# Patient Record
Sex: MALE | Race: BLACK OR AFRICAN AMERICAN | URBAN - METROPOLITAN AREA
[De-identification: names, ages, dates, MRNs, and addresses within clinical notes are randomized per-mention and may not be internally consistent; named-entity substitution may affect disease eponyms.]

---

## 2017-03-07 ENCOUNTER — OFFICE VISIT (OUTPATIENT)
Dept: INTERNAL MEDICINE CLINIC | Age: 32
End: 2017-03-07

## 2017-03-07 ENCOUNTER — TELEPHONE (OUTPATIENT)
Dept: INTERNAL MEDICINE CLINIC | Age: 32
End: 2017-03-07

## 2017-03-07 VITALS
RESPIRATION RATE: 16 BRPM | HEART RATE: 69 BPM | TEMPERATURE: 98.4 F | SYSTOLIC BLOOD PRESSURE: 136 MMHG | WEIGHT: 250 LBS | HEIGHT: 68 IN | BODY MASS INDEX: 37.89 KG/M2 | OXYGEN SATURATION: 100 % | DIASTOLIC BLOOD PRESSURE: 85 MMHG

## 2017-03-07 DIAGNOSIS — Z23 ENCOUNTER FOR IMMUNIZATION: ICD-10-CM

## 2017-03-07 DIAGNOSIS — Z23 NEED FOR HEPATITIS A IMMUNIZATION: ICD-10-CM

## 2017-03-07 DIAGNOSIS — Z23 NEED FOR TDAP VACCINATION: Primary | ICD-10-CM

## 2017-03-07 NOTE — PATIENT INSTRUCTIONS
Hepatitis A Vaccine: What You Need to Know  Why get vaccinated? Hepatitis A is a serious liver disease. It is caused by the hepatitis A virus (HAV). HAV is spread from person to person through contact with the feces (stool) of people who are infected, which can easily happen if someone does not wash his or her hands properly. You can also get hepatitis A from food, water, or objects contaminated with HAV. Symptoms of hepatitis A can include:  · Fever, fatigue, loss of appetite, nausea, vomiting, and/or joint pain. · Severe stomach pains and diarrhea (mainly in children). · Jaundice (yellow skin or eyes, dark urine, phillip-colored bowel movements). These symptoms usually appear 2 to 6 weeks after exposure and usually last less than 2 months, although some people can be ill for as long as 6 months. If you have hepatitis A, you may be too ill to work. Children often do not have symptoms, but most adults do. You can spread HAV without having symptoms. Hepatitis A can cause liver failure and death, although this is rare and occurs more commonly in persons 48years of age or older and persons with other liver diseases, such as hepatitis B or C. Hepatitis A vaccine can prevent hepatitis A. Hepatitis A vaccines were recommended in the [facility] beginning in 1996. Since then, the number of cases reported each year in the [place] has dropped from around 31,000 cases to fewer than 1,500 cases. Hepatitis A vaccine  Hepatitis A vaccine is an inactivated (killed) vaccine. You will need 2 doses for long-lasting protection. These doses should be given at least 6 months apart. Children are routinely vaccinated between their first and second birthdays (15 through 22 months of age). Older children and adolescents can get the vaccine after 23 months. Adults who have not been vaccinated previously and want to be protected against hepatitis A can also get the vaccine.   You should get hepatitis A vaccine if you:  · Are traveling to countries where hepatitis A is common. · Are a man who has sex with other men. · Use illegal drugs. · Have a chronic liver disease such as hepatitis B or hepatitis C.  · Are being treated with clotting-factor concentrates. · Work with hepatitis A-infected animals or in a hepatitis A research laboratory. · Expect to have close personal contact with an international adoptee from a country where hepatitis A is common. Ask your healthcare provider if you want more information about any of these groups. There are no known risks to getting hepatitis A vaccine at the same time as other vaccines. Some people should not get this vaccine  Tell the person who is giving you the vaccine:  · If you have any severe, life-threatening allergies. If you ever had a life-threatening allergic reaction after a dose of hepatitis A vaccine, or have a severe allergy to any part of this vaccine, you may be advised not to get vaccinated. Ask your health care provider if you want information about vaccine components. · If you are not feeling well. If you have a mild illness, such as a cold, you can probably get the vaccine today. If you are moderately or severely ill, you should probably wait until you recover. Your doctor can advise you. Risks of a vaccine reaction  With any medicine, including vaccines, there is a chance of side effects. These are usually mild and go away on their own, but serious reactions are also possible. Most people who get hepatitis A vaccine do not have any problems with it. Minor problems following hepatitis A vaccine include:  · Soreness or redness where the shot was given  · Low-grade fever  · Headache  · Tiredness  If these problems occur, they usually begin soon after the shot and last 1 or 2 days. Your doctor can tell you more about these reactions. Other problems that could happen after this vaccine:  · People sometimes faint after a medical procedure, including vaccination. Sitting or lying down for about 15 minutes can help prevent fainting, and injuries caused by a fall. Tell your provider if you feel dizzy, or have vision changes or ringing in the ears. · Some people get shoulder pain that can be more severe and longer lasting than the more routine soreness that can follow injections. This happens very rarely. · Any medication can cause a severe allergic reaction. Such reactions from a vaccine are very rare, estimated at about 1 in a million doses, and would happen within a few minutes to a few hours after the vaccination. As with any medicine, there is a very remote chance of a vaccine causing a serious injury or death. The safety of vaccines is always being monitored. For more information, visit: www.cdc.gov/vaccinesafety. What if there is a serious problem? What should I look for? · Look for anything that concerns you, such as signs of a severe allergic reaction, very high fever, or unusual behavior. Signs of a severe allergic reaction can include hives, swelling of the face and throat, difficulty breathing, a fast heartbeat, dizziness, and weakness. These would usually start a few minutes to a few hours after the vaccination. What should I do? · If you think it is a severe allergic reaction or other emergency that can't wait, call call 911and get to the nearest hospital. Otherwise, call your clinic. · Afterward, the reaction should be reported to the Vaccine Adverse Event Reporting System (VAERS). Your doctor should file this report, or you can do it yourself through the VAERS web site at www.vaers. hhs.gov, or by calling 9-791.172.6247. VAERS does not give medical advice. The National Vaccine Injury Compensation Program  The National Vaccine Injury Compensation Program (VICP) is a federal program that was created to compensate people who may have been injured by certain vaccines.   Persons who believe they may have been injured by a vaccine can learn about the program and about filing a claim by calling 6-257.125.9156 or visiting the [employer] website at www.[facility].gov/vaccinecompensation. There is a time limit to file a claim for compensation. How can I learn more? · Ask your healthcare provider. He or she can give you the vaccine package insert or suggest other sources of information. · Call your local or state health department. · Contact the Centers for Disease Control and Prevention (CDC):  ¨ Call 8-178.226.6638 (1-800-CDC-INFO). ¨ Visit CDC's website at www.cdc.gov/vaccines. Vaccine Information Statement  Hepatitis A Vaccine  7/20/2016  42 U. S.C. § 300aa-26  [place]. S. Department of Health and Human Services  Centers for Disease Control and Prevention  Many Vaccine Information Statements are available in [place] and other languages. See www.immunize.org/vis. Hojas de información sobre vacunas están disponibles en español y en otros idiomas. Visite www.immunize.org/vis. Care instructions adapted under license by your healthcare professional. If you have questions about a medical condition or this instruction, always ask your healthcare professional. [place] [address] any warranty or liability for your use of this information. Influenza (Flu) Vaccine (Inactivated or Recombinant): What You Need to Know  Why get vaccinated? Influenza (\"flu\") is a contagious disease that spreads around the [place] every winter, usually between October and May. Flu is caused by influenza viruses and is spread mainly by coughing, sneezing, and close contact. Anyone can get flu. Flu strikes suddenly and can last several days. Symptoms vary by age, but can include:  · Fever/chills. · Sore throat. · Muscle aches. · Fatigue. · Cough. · Headache. · Runny or stuffy nose. Flu can also lead to pneumonia and blood infections, and cause diarrhea and seizures in children. If you have a medical condition, such as heart or lung disease, flu can make it worse.   Flu is more dangerous for some people. Infants and young children, people 72years of age and older, pregnant women, and people with certain health conditions or a weakened immune system are at greatest risk. Each year thousands of people in the [facility] die from flu, and many more are hospitalized. Flu vaccine can:  · Keep you from getting flu. · Make flu less severe if you do get it. · Keep you from spreading flu to your family and other people. Inactivated and recombinant flu vaccines  A dose of flu vaccine is recommended every flu season. Children 6 months through 6years of age may need two doses during the same flu season. Everyone else needs only one dose each flu season. Some inactivated flu vaccines contain a very small amount of a mercury-based preservative called thimerosal. Studies have not shown thimerosal in vaccines to be harmful, but flu vaccines that do not contain thimerosal are available. There is no live flu virus in flu shots. They cannot cause the flu. There are many flu viruses, and they are always changing. Each year a new flu vaccine is made to protect against three or four viruses that are likely to cause disease in the upcoming flu season. But even when the vaccine doesn't exactly match these viruses, it may still provide some protection. Flu vaccine cannot prevent:  · Flu that is caused by a virus not covered by the vaccine. · Illnesses that look like flu but are not. Some people should not get this vaccine  Tell the person who is giving you the vaccine:  · If you have any severe (life-threatening) allergies. If you ever had a life-threatening allergic reaction after a dose of flu vaccine, or have a severe allergy to any part of this vaccine, you may be advised not to get vaccinated. Most, but not all, types of flu vaccine contain a small amount of egg protein.   · If you ever had Guillain-Barré syndrome (also called GBS) Some people with a history of GBS should not get this vaccine. This should be discussed with your doctor. · If you are not feeling well. It is usually okay to get flu vaccine when you have a mild illness, but you might be asked to come back when you feel better. Risks of a vaccine reaction  With any medicine, including vaccines, there is a chance of reactions. These are usually mild and go away on their own, but serious reactions are also possible. Most people who get a flu shot do not have any problems with it. Minor problems following a flu shot include:  · Soreness, redness, or swelling where the shot was given  · Hoarseness  · Sore, red or itchy eyes  · Cough  · Fever  · Aches  · Headache  · Itching  · Fatigue  If these problems occur, they usually begin soon after the shot and last 1 or 2 days. More serious problems following a flu shot can include the following:  · There may be a small increased risk of Guillain-Barré Syndrome (GBS) after inactivated flu vaccine. This risk has been estimated at 1 or 2 additional cases per million people vaccinated. This is much lower than the risk of severe complications from flu, which can be prevented by flu vaccine. · Floyce Topher children who get the flu shot along with pneumococcal vaccine (PCV13) and/or DTaP vaccine at the same time might be slightly more likely to have a seizure caused by fever. Ask your doctor for more information. Tell your doctor if a child who is getting flu vaccine has ever had a seizure  Problems that could happen after any injected vaccine:  · People sometimes faint after a medical procedure, including vaccination. Sitting or lying down for about 15 minutes can help prevent fainting, and injuries caused by a fall. Tell your doctor if you feel dizzy, or have vision changes or ringing in the ears. · Some people get severe pain in the shoulder and have difficulty moving the arm where a shot was given. This happens very rarely. · Any medication can cause a severe allergic reaction.  Such reactions from a vaccine are very rare, estimated at about 1 in a million doses, and would happen within a few minutes to a few hours after the vaccination. As with any medicine, there is a very remote chance of a vaccine causing a serious injury or death. The safety of vaccines is always being monitored. For more information, visit: www.cdc.gov/vaccinesafety/. What if there is a serious reaction? What should I look for? · Look for anything that concerns you, such as signs of a severe allergic reaction, very high fever, or unusual behavior. Signs of a severe allergic reaction can include hives, swelling of the face and throat, difficulty breathing, a fast heartbeat, dizziness, and weakness - usually within a few minutes to a few hours after the vaccination. What should I do? · If you think it is a severe allergic reaction or other emergency that can't wait, call 9-1-1 and get the person to the nearest hospital. Otherwise, call your doctor. · Reactions should be reported to the \"Vaccine Adverse Event Reporting System\" (VAERS). Your doctor should file this report, or you can do it yourself through the VAERS website at www.vaers. [facility].gov, or by calling 3-501.531.1704. [employer] does not give medical advice. The National Vaccine Injury Compensation Program  The National Vaccine Injury Compensation Program (VICP) is a federal program that was created to compensate people who may have been injured by certain vaccines. Persons who believe they may have been injured by a vaccine can learn about the program and about filing a claim by calling 5-937.203.9200 or visiting the [employer] website at www.[facility].gov/vaccinecompensation. There is a time limit to file a claim for compensation. How can I learn more? · Ask your healthcare provider. He or she can give you the vaccine package insert or suggest other sources of information. · Call your local or state health department.   · Contact the Centers for Disease Control and Prevention ([facility]):  ¨ Call 2-606.602.8616 (1-800-CDC-INFO) or  ¨ Visit CDC's website at www.cdc.gov/flu  Vaccine Information Statement  Inactivated Influenza Vaccine  8/7/2015)  42 [address] 111LN-99  Department of Health and Human Services  Centers for Disease Control and Prevention  Many Vaccine Information Statements are available in [place] and other languages. See www.immunize.org/vis. Muchas hojas de información sobre vacunas están disponibles en español y en otros idiomas. Visite www.immunize.org/vis. Care instructions adapted under license by your healthcare professional. If you have questions about a medical condition or this instruction, always ask your healthcare professional. [address] any warranty or liability for your use of this information. Elevated Blood Pressure: Care Instructions  Your Care Instructions    Blood pressure is a measure of how hard the blood pushes against the walls of your arteries. It's normal for blood pressure to go up and down throughout the day. But if it stays up over time, you have high blood pressure. Two numbers tell you your blood pressure. The first number is the systolic pressure. It shows how hard the blood pushes when your heart is pumping. The second number is the diastolic pressure. It shows how hard the blood pushes between heartbeats, when your heart is relaxed and filling with blood. An ideal blood pressure in adults is less than 120/80 (say \"120 over 80\"). High blood pressure is 140/90 or higher. You have high blood pressure if your top number is 140 or higher or your bottom number is 90 or higher, or both. The main test for high blood pressure is simple, fast, and painless. To diagnose high blood pressure, your doctor will test your blood pressure at different times. You may have to check your blood pressure at home if there is reason to think that the results in the doctor's office aren't accurate.   If you are diagnosed with high blood pressure, you can work with your doctor to make a long-term plan to manage it. Follow-up care is a key part of your treatment and safety. Be sure to make and go to all appointments, and call your doctor if you are having problems. It's also a good idea to know your test results and keep a list of the medicines you take. How can you care for yourself at home? · Do not smoke. Smoking increases your risk for heart attack and stroke. If you need help quitting, talk to your doctor about stop-smoking programs and medicines. These can increase your chances of quitting for good. · Stay at a healthy weight. · Try to limit how much sodium you eat to less than 2,300 milligrams (mg) a day. Your doctor may ask you to try to eat less than 1,500 mg a day. · Be physically active. Get at least 30 minutes of exercise on most days of the week. Walking is a good choice. You also may want to do other activities, such as running, swimming, cycling, or playing tennis or team sports. · Avoid or limit alcohol. Talk to your doctor about whether you can drink any alcohol. · Eat plenty of fruits, vegetables, and low-fat dairy products. Eat less saturated and total fats. · Learn how to check your blood pressure at home. When should you call for help? Call your doctor now or seek immediate medical care if:  · Your blood pressure is much higher than normal (such as 180/110 or higher). · You think high blood pressure is causing symptoms such as:  ¨ Severe headache. ¨ Blurry vision. Watch closely for changes in your health, and be sure to contact your doctor if:  · You do not get better as expected. Where can you learn more? Go to http://funmi-arnold.info/. Enter F669 in the search box to learn more about \"Elevated Blood Pressure: Care Instructions. \"  Current as of: January 27, 2016  Content Version: 11.1  © 7929-6985 [employer].  Care instructions adapted under license by [employer] (which disclaims liability or warranty for this information). If you have questions about a medical condition or this instruction, always ask your healthcare professional. [address] any warranty or liability for your use of this information.

## 2017-03-07 NOTE — PROGRESS NOTES
Patient presents for travel immunization, states he does not want the flu shot. Patient states he is travelling to [place] . Patient denies any HA,blurry vision, unilateral weakness, slurred speech,facial drooling,drooping, NV,numbness,tingling,dizziness,palpitations, malaise,faigue,confusion,SOB,CP. Called patient,two identifiers confirmed,discussed elevated BP with patient and advised to f/u with PCP,states he was told by his PCP that his BP is elevated and will f/u as advised.

## 2017-03-07 NOTE — PROGRESS NOTES
HISTORY OF PRESENT ILLNESS  Antony Seip is a 27 y.o. male. Patient presents for travel immunization, states he does not want the flu shot. Patient states he is travelling to [place] . Patient denies any HA,blurry vision, unilateral weakness, slurred speech,facial drooling,drooping, NV,numbness,tingling,dizziness,palpitations, malaise,faigue,confusion,SOB,CP. New Patient   The history is provided by the patient. This is a new problem. Immunization/Injection         Review of Systems   Constitutional: Negative for chills, diaphoresis, fever, malaise/fatigue and weight loss. HENT: Negative for ear discharge, ear pain, hearing loss, nosebleeds and tinnitus. Respiratory: Negative. Cardiovascular: Negative. Gastrointestinal: Negative. Genitourinary: Negative. Skin: Negative for itching and rash. Neurological: Negative. Negative for weakness. Physical Exam   Constitutional: He is oriented to person, place, and time. Cardiovascular: Normal rate. Pulmonary/Chest: Effort normal and breath sounds normal.   Neurological: He is alert and oriented to person, place, and time. ASSESSMENT and PLAN    ICD-10-CM ICD-9-CM    1. Need for Tdap vaccination Z23 V06.1 TETANUS, DIPHTHERIA TOXOIDS AND ACELLULAR PERTUSSIS VACCINE (TDAP), IN INDIVIDS. >=7, IM   2. Need for hepatitis A immunization Z23 V05.3 HEPATITIS A VACCINE, ADULT DOSAGE, IM   3. Encounter for immunization Z23 V03.89 INFLUENZA VIRUS VAC QUAD,SPLIT,PRESV FREE SYRINGE 3/> YRS IM     Encounter Diagnoses   Name Primary?     Need for Tdap vaccination Yes    Need for hepatitis A immunization     Encounter for immunization      Orders Placed This Encounter    TETANUS, DIPHTHERIA TOXOIDS AND ACELLULAR PERTUSSIS VACCINE (TDAP), IN INDIVIDS. >=7, IM    Hepatitis A vaccine, adult dosage, IM    Influenza virus vaccine (QUADRIVALENT PRES FREE SYRINGE) IM 3 years and older     Orders Placed This Encounter    TETANUS, DIPHTHERIA TOXOIDS AND ACELLULAR PERTUSSIS VACCINE (TDAP), IN INDIVIDS. >=7, IM    Hepatitis A vaccine, adult dosage, IM    Influenza virus vaccine (QUADRIVALENT PRES FREE SYRINGE) IM 3 years and older     Orders Placed This Encounter    TETANUS, DIPHTHERIA TOXOIDS AND ACELLULAR PERTUSSIS VACCINE (TDAP), IN INDIVIDS. >=7, IM    Hepatitis A vaccine, adult dosage, IM    Influenza virus vaccine (QUADRIVALENT PRES FREE SYRINGE) IM 3 years and older     Kiya Snow was seen today for new patient and immunization/injection. Diagnoses and all orders for this visit:    Need for Tdap vaccination  -     TETANUS, DIPHTHERIA TOXOIDS AND ACELLULAR PERTUSSIS VACCINE (TDAP), IN INDIVIDS. >=7, IM    Need for hepatitis A immunization  -     Hepatitis A vaccine, adult dosage, IM    Encounter for immunization  -     Influenza virus vaccine (QUADRIVALENT PRES FREE SYRINGE) IM 3 years and older      Follow-up Disposition:  Return if symptoms worsen or fail to improve. Called patient,two identifiers confirmed,discussed elevated BP with patient and advised to f/u with PCP,states he was told by his PCP that his BP is elevated and will f/u as advised.

## 2017-03-07 NOTE — MR AVS SNAPSHOT
Visit Information Date & Time Provider Department Dept. Phone Encounter #  
 3/7/2017 10:00 AM Talia OquendoGATITO [facility] [phone] [id number] Upcoming Health Maintenance Date Due DTaP/Tdap/Td series (1 - Tdap) 8/2/2005 INFLUENZA AGE 9 TO ADULT 8/1/2016 Allergies as of 3/7/2017  Review Complete On: 3/7/2017 By: Dixon Polanco LPN No Known Allergies Current Immunizations  Never Reviewed No immunizations on file. Not reviewed this visit You Were Diagnosed With   
  
 Codes Comments Need for Tdap vaccination    -  Primary ICD-10-CM: U12 ICD-9-CM: V06.1 Need for hepatitis A immunization     ICD-10-CM: E75 ICD-9-CM: V05.3 Encounter for immunization     ICD-10-CM: M65 ICD-9-CM: V03.89 Vitals BP Pulse Temp Resp Height(growth percentile) Weight(growth percentile) 136/85 (BP 1 Location: Left arm, BP Patient Position: Sitting) 69 98.4 °F (36.9 °C) (Oral) 16 5' 8\" (1.727 m) 250 lb (113.4 kg) SpO2 BMI Smoking Status 100% 38.01 kg/m2 Former Smoker Vitals History BMI and BSA Data Body Mass Index Body Surface Area 38.01 kg/m 2 2.33 m 2 Preferred Pharmacy Pharmacy Name Phone [id number] [address] D[place] Spectstefanie [phone] Your Updated Medication List  
  
Notice  As of 3/7/2017 11:20 AM  
 You have not been prescribed any medications. Introducing [facility] & HEALTH SERVICES! Milan Peralta introduces Hyperfair patient portal. Now you can access parts of your medical record, email your doctor's office, and request medication refills online. 1. In your internet browser, go to [email or web address]. [email or web address] [email or web address]/Green Plug 2. Click on the First Time User? Click Here link in the Sign In box. You will see the New Member Sign Up page. 3. Enter your Hyperfair Access Code exactly as it appears below.  You will not need to use this code after youve completed the sign-up process. If you do not sign up before the expiration date, you must request a new code. · QD Vision Access Code: [email or web address] Expires: 6/5/2017 11:19 AM 
 
4. Enter the last four digits of your Social Security Number (xxxx) and Date of Birth (mm/dd/yyyy) as indicated and click Submit. You will be taken to the next sign-up page. 5. Create a QD Vision ID. This will be your QD Vision login ID and cannot be changed, so think of one that is secure and easy to remember. 6. Create a QD Vision password. You can change your password at any time. 7. Enter your Password Reset Question and Answer. This can be used at a later time if you forget your password. 8. Enter your e-mail address. You will receive e-mail notification when new information is available in [address] 9. Click Sign Up. You can now view and download portions of your medical record. 10. Click the Download Summary menu link to download a portable copy of your medical information. If you have questions, please visit the Frequently Asked Questions section of the QD Vision website. Remember, QD Vision is NOT to be used for urgent needs. For medical emergencies, dial 911. Now available from your iPhone and Android! Please provide this summary of care documentation to your next provider. If you have any questions after today's visit, please call [phone].

## 2017-03-07 NOTE — TELEPHONE ENCOUNTER
Called patient,two identifiers confirmed,discussed elevated BP with patient and advised to f/u with PCP,states he was told by his PCP that his BP is elevated and will f/u as advised.

## 2017-03-07 NOTE — PROGRESS NOTES
Pt presented today for immunizations for influenza ( questionable), hep a and tdap. Has pt had any falls since last 30 days? no.  Pt preferred language for health care discussion is english. Advanced Directive? No    Is someone accompanying this pt? no    Is the patient using any DME equipment during OV? No      1. Have you been to the ER, urgent care clinic since your last 30 days? Hospitalized since your last 30 days? No    2. Have you seen or consulted any other health care providers outside of the [facility] since your last 30 days? No      Pt  has a reminder for a \"due or due soon\" health maintenance. I have asked that he contact his primary care provider for follow-up on this health maintenance.

## 2017-04-10 ENCOUNTER — OFFICE VISIT (OUTPATIENT)
Dept: INTERNAL MEDICINE CLINIC | Age: 32
End: 2017-04-10

## 2017-04-10 ENCOUNTER — TELEPHONE (OUTPATIENT)
Dept: INTERNAL MEDICINE CLINIC | Age: 32
End: 2017-04-10

## 2017-04-10 VITALS
SYSTOLIC BLOOD PRESSURE: 130 MMHG | HEIGHT: 68 IN | DIASTOLIC BLOOD PRESSURE: 80 MMHG | OXYGEN SATURATION: 99 % | BODY MASS INDEX: 38.01 KG/M2 | HEART RATE: 82 BPM | WEIGHT: 250.8 LBS | TEMPERATURE: 98.6 F | RESPIRATION RATE: 16 BRPM

## 2017-04-10 DIAGNOSIS — R19.7 DIARRHEA, UNSPECIFIED TYPE: ICD-10-CM

## 2017-04-10 DIAGNOSIS — R10.32 LLQ PAIN: ICD-10-CM

## 2017-04-10 DIAGNOSIS — K59.00 CONSTIPATION, UNSPECIFIED CONSTIPATION TYPE: ICD-10-CM

## 2017-04-10 DIAGNOSIS — K92.1 BLOODY STOOLS: Primary | ICD-10-CM

## 2017-04-10 RX ORDER — POLYETHYLENE GLYCOL 3350 17 G/17G
17 POWDER, FOR SOLUTION ORAL DAILY
COMMUNITY
End: 2019-04-24

## 2017-04-10 RX ORDER — BISACODYL 5 MG
TABLET, DELAYED RELEASE (ENTERIC COATED) ORAL
Refills: 0 | COMMUNITY
Start: 2017-04-04 | End: 2019-04-24

## 2017-04-10 NOTE — TELEPHONE ENCOUNTER
2 pt. Identifiers confirmed. Pt. Notified of his aptmt c GI, Dr. Juarez Nettles for [id number] @ [phone] ( office). Pt. Given address and number to facility and to to bring ID, med list and ins card. Cancel/reschedule 24hrs in advance if necessary. Pending results of abd XR. Pt. Verbalized understanding. No other questions/concerns at this time.

## 2017-04-10 NOTE — PROGRESS NOTES
Chief Complaint   Patient presents with    New Patient     establish care    Constipation     bloating, f/u on [place] [address] ED visit for constipation after diarrhea bouts c blood. bloody stools since resolved    Abdominal Pain     lower and and left side       HPI:     Elise Polanco is a 27 y.o.  male with no real past medical history here for the above complaint. He wiped and there was diarrhea and bright blood. He had been eating steak and took immodium AD and became constipated. More blood in stool after straining. He went to [place] [address] ER. He was in [facility] from 3/10/[facility]-3/26/17 and the blood on the tissue paper and was hand full amount. No blood clots. He has cramping in his LLQ area and he feels like he has to go to the bathroom and when he goes to the bathroom, he has some relief but not a lot. He denies any nausea and vomiting. Bloody stools gone and no black tarry stools. He is having a BM everyday, but not a lot. He usually goes twice a day. He denies any chest pain, shortness of breath, dizziness, headaches. He is taking the miralaz and gentle laxative. He gone 1 week without the miralax. Hospital records were reviewed and labs were okay. History reviewed. No pertinent past medical history. Past Surgical History:   Procedure Laterality Date    HX WISDOM TEETH EXTRACTION       Current Outpatient Prescriptions   Medication Sig    GENTLE LAXATIVE 5 mg EC tablet Bisacodyl TAKE 1 TABLET BY MOUTH EVERY DAY    polyethylene glycol (MIRALAX) 17 gram/dose powder Take 17 g by mouth daily.  ESOMEPRAZOLE MAGNESIUM (NEXIUM PO) Take  by mouth as needed. No current facility-administered medications for this visit.       Health Maintenance   Topic Date Due    DTaP/Tdap/Td series (2 - Td) 03/07/2027    INFLUENZA AGE 9 TO ADULT  Completed     Immunization History   Administered Date(s) Administered    Hep A Vaccine (Adult) 03/07/2017    Influenza Vaccine (Quad) PF 03/07/2017    Tdap 03/07/2017     No LMP for male patient. Allergies and Intolerances:   No Known Allergies    Family History:   Family History   Problem Relation Age of Onset    No Known Problems Mother     Cancer Father        Social History:   He  reports that he has quit smoking. He has never used smokeless tobacco.  He  reports that he drinks about 1.8 oz of alcohol per week           ·     Objective:   Physical exam:   Visit Vitals    /80 (BP 1 Location: Left arm, BP Patient Position: Sitting)    Pulse 82    Temp 98.6 °F (37 °C) (Oral)    Resp 16    Ht 5' 8\" (1.727 m)    Wt 250 lb 12.8 oz (113.8 kg)    SpO2 99%    BMI 38.13 kg/m2        Generally: Pleasant male in no acute distress  Cardiac Exam: regular, rate, and rhythm. Normal S1 and S2. No murmurs, gallops, or rubs  Pulmonary exam: Clear to auscultation bilaterally  Abdominal exam: Positive bowel sounds in all four quadrants, soft, nondistended, nontender  Extremities: 2+ dorsalis pedis pulses bilaterally. No pedal edema    bilaterally    LABS/Radiological Tests:  none      ASSESSMENT/PLAN:    1. Bloody stools  -     XR ABD (AP AND ERECT OR DECUB); Future  -     REFERRAL TO GASTROENTEROLOGY    2. LLQ pain  -     XR ABD (AP AND ERECT OR DECUB); Future  -     REFERRAL TO GASTROENTEROLOGY    3. Diarrhea, unspecified type  -     XR ABD (AP AND ERECT OR DECUB); Future  -     REFERRAL TO GASTROENTEROLOGY    4. Constipation, unspecified constipation type  -     XR ABD (AP AND ERECT OR DECUB); Future  -     REFERRAL TO GASTROENTEROLOGY    5. Patient verbalized understanding and agreement with the plan. 6. Patient was given an after-visit summary. 7. Follow-up Disposition:  Return if symptoms worsen or fail to improve. or sooner if worsening symptoms.                 Jayde Mcdonald MD

## 2017-04-10 NOTE — MR AVS SNAPSHOT
Visit Information Date & Time Provider Department Dept. Phone Encounter #  
 4/10/2017 12:45 PM Janie Vasques MD [facility] [phone] [id number] Follow-up Instructions Return if symptoms worsen or fail to improve. Upcoming Health Maintenance Date Due DTaP/Tdap/Td series (2 - Td) 3/7/2027 Allergies as of 4/10/2017  Review Complete On: 4/10/2017 By: Jessica Dominguez MD  
 No Known Allergies Current Immunizations  Reviewed on 3/7/2017 Name Date Hep A Vaccine (Adult) 3/7/2017 11:54 AM  
 Influenza Vaccine (Quad) PF 3/7/2017 11:56 AM  
 Tdap 3/7/2017 11:53 AM  
  
 Not reviewed this visit You Were Diagnosed With   
  
 Codes Comments Bloody stools    -  Primary ICD-10-CM: K92.1 ICD-9-CM: 578.1 LLQ pain     ICD-10-CM: R10.32 
ICD-9-CM: 789.04 Diarrhea, unspecified type     ICD-10-CM: R19.7 ICD-9-CM: 787.91 Constipation, unspecified constipation type     ICD-10-CM: K59.00 ICD-9-CM: 564.00 Vitals BP Pulse Temp Resp Height(growth percentile) Weight(growth percentile) 130/80 (BP 1 Location: Left arm, BP Patient Position: Sitting) 82 98.6 °F (37 °C) (Oral) 16 5' 8\" (1.727 m) 250 lb 12.8 oz (113.8 kg) SpO2 BMI Smoking Status 99% 38.13 kg/m2 Former Smoker Vitals History BMI and BSA Data Body Mass Index Body Surface Area  
 38.13 kg/m 2 2.34 m 2 Preferred Pharmacy [facility] Name Phone [id number] [phone] Your Updated Medication List  
  
   
This list is accurate as of: 4/10/17  1:29 PM.  Always use your most recent med list.  
  
  
  
  
 GENTLE LAXATIVE 5 mg EC tablet Generic drug:  bisacodyl Bisacodyl TAKE 1 TABLET BY MOUTH EVERY DAY  
  
 MIRALAX 17 gram/dose powder Generic drug:  polyethylene glycol Take 17 g by mouth daily. NEXIUM PO Take  by mouth as needed. We Performed the Following REFERRAL TO GASTROENTEROLOGY [LKS09 Custom] Comments:  
 Please evaluate patient for bloody stools and constipation/diarrhea and LLQ pain ASAP. Follow-up Instructions Return if symptoms worsen or fail to improve. To-Do List   
 04/10/2017 Imaging:  XR ABD (AP AND ERECT OR DECUB) Referral Information Referral ID Referred By Referred To  
  
 [id number] Ricardo Cristina MD   
   [id number] [place] [address] [address] [place] [address] Phone: [phone] Fax: [phone] Visits Status Start Date End Date 1 New Request 4/10/17 4/10/18 If your referral has a status of pending review or denied, additional information will be sent to support the outcome of this decision. Patient Instructions 1) follow-up as needed or sooner if worsening symptoms. Introducing [facility]! [facility] introduces Melboss patient portal. Now you can access parts of your medical record, email your doctor's office, and request medication refills online. 1. In your internet browser, go to [email or web address] 2. Click on the First Time User? Click Here link in the Sign In box. You will see the New Member Sign Up page. 3. Enter your Melboss Access Code exactly as it appears below. You will not need to use this code after youve completed the sign-up process. If you do not sign up before the expiration date, you must request a new code. · Melboss Access Code: [email or web address] Expires: 6/5/2017 12:19 PM 
 
4. Enter the last four digits of your Social Security Number (xxxx) and Date of Birth (mm/dd/yyyy) as indicated and click Submit. You will be taken to the next sign-up page. 5. Create a Melboss ID. This will be your Melboss login ID and cannot be changed, so think of one that is secure and easy to remember. 6. Create a Orchestra Networkst password. You can change your password at any time. 7. Enter your Password Reset Question and Answer. This can be used at a later time if you forget your password. 8. Enter your e-mail address. You will receive e-mail notification when new information is available in [address] 9. Click Sign Up. You can now view and download portions of your medical record. 10. Click the Download Summary menu link to download a portable copy of your medical information. If you have questions, please visit the Frequently Asked Questions section of the exoro system website. Remember, exoro system is NOT to be used for urgent needs. For medical emergencies, dial 911. Now available from your iPhone and Android! Please provide this summary of care documentation to your next provider. If you have any questions after today's visit, please call [phone].

## 2017-04-10 NOTE — PROGRESS NOTES
ROOM # 1    Raquel Flowers presents today for   Chief Complaint   Patient presents with    New Patient     establish care    Constipation     bloating, f/u on [facility] ED visit for constipation after diarrhea bouts c blood. bloody stools since resolved    Abdominal Pain     lower and and left side       Raquel Flowers preferred language for health care discussion is english/other. Is someone accompanying this pt? no    Is the patient using any DME equipment during OV? no    Depression Screening:  PHQ 2 / 9, over the last two weeks 4/10/2017 3/7/2017   Little interest or pleasure in doing things Not at all Not at all   Feeling down, depressed or hopeless Not at all Not at all   Total Score PHQ 2 0 0       Learning Assessment:  Learning Assessment 4/10/2017 3/7/2017   PRIMARY LEARNER Patient Patient   HIGHEST LEVEL OF EDUCATION - PRIMARY LEARNER  4 YEARS OF COLLEGE 4 YEARS OF COLLEGE   BARRIERS PRIMARY LEARNER NONE NONE   CO-LEARNER CAREGIVER No No   PRIMARY LANGUAGE ENGLISH ENGLISH   LEARNER PREFERENCE PRIMARY READING READING     VIDEOS -   ANSWERED BY patient patient   RELATIONSHIP SELF SELF       Abuse Screening:  No flowsheet data found. Fall Risk  No flowsheet data found. Health Maintenance reviewed and discussed per provider. Yes      Advance Directive:  1. Do you have an advance directive in place? Patient Reply: no    2. If not, would you like material regarding how to put one in place? Patient Reply: no    Coordination of Care:  1. Have you been to the ER, urgent care clinic since your last visit? Hospitalized since your last visit? [facility] for constipation and f/u on bloody diarrhea (prior)    2. Have you seen or consulted any other health care providers outside of the [facility] since your last visit? Include any pap smears or colon screening.  no

## 2017-04-12 ENCOUNTER — TELEPHONE (OUTPATIENT)
Dept: INTERNAL MEDICINE CLINIC | Age: 32
End: 2017-04-12

## 2017-04-12 NOTE — TELEPHONE ENCOUNTER
2 pt. Identifiers confirmed. Pt. Notified of below, explained to pt. Abd XR was unremarkable, though limited. Pt. States the pain is gone, and he feels a little better with the prescribed meds, but still slightly constipated. Pt. Has upcoming GI aptmt on Friday. No other questions/concerns at this time.

## 2017-04-12 NOTE — TELEPHONE ENCOUNTER
----- Message from Ranjeet Birmingham MD sent at 4/12/2017  3:22 PM EDT -----  1) Please let pt know AXR was negative, although limited. 2) How is his diarrhea and LLQ pain?

## 2017-05-21 PROBLEM — Z86.010 H/O COLONOSCOPY WITH POLYPECTOMY: Status: ACTIVE | Noted: 2017-05-04

## 2017-05-21 PROBLEM — Z98.890 H/O COLONOSCOPY WITH POLYPECTOMY: Status: ACTIVE | Noted: 2017-05-04

## 2019-04-24 ENCOUNTER — OFFICE VISIT (OUTPATIENT)
Dept: INTERNAL MEDICINE CLINIC | Age: 34
End: 2019-04-24

## 2019-04-24 ENCOUNTER — HOSPITAL ENCOUNTER (OUTPATIENT)
Dept: LAB | Age: 34
Discharge: HOME | End: 2019-04-24
Payer: COMMERCIAL

## 2019-04-24 VITALS
OXYGEN SATURATION: 99 % | HEART RATE: 70 BPM | TEMPERATURE: 99 F | HEIGHT: 68 IN | SYSTOLIC BLOOD PRESSURE: 137 MMHG | WEIGHT: 262 LBS | RESPIRATION RATE: 16 BRPM | DIASTOLIC BLOOD PRESSURE: 81 MMHG | BODY MASS INDEX: 39.71 KG/M2

## 2019-04-24 DIAGNOSIS — R10.32 ABDOMINAL PAIN, LEFT LOWER QUADRANT: ICD-10-CM

## 2019-04-24 DIAGNOSIS — R10.9 LEFT FLANK PAIN: Primary | ICD-10-CM

## 2019-04-24 DIAGNOSIS — R10.9 LEFT FLANK PAIN: ICD-10-CM

## 2019-04-24 LAB
ALBUMIN SERPL-MCNC: 4.2 G/DL (ref 3.4–5)
ALBUMIN/GLOB SERPL: 1.1 {RATIO} (ref 0.8–1.7)
ALP SERPL-CCNC: 68 U/L (ref 45–117)
ALT SERPL-CCNC: 33 U/L (ref 16–61)
ANION GAP SERPL CALC-SCNC: 6 MMOL/L (ref 3–18)
APPEARANCE UR: CLEAR
AST SERPL-CCNC: 14 U/L (ref 15–37)
BASOPHILS # BLD: 0 K/UL (ref 0–0.1)
BASOPHILS NFR BLD: 0 % (ref 0–2)
BILIRUB SERPL-MCNC: 0.6 MG/DL (ref 0.2–1)
BILIRUB UR QL: NEGATIVE
BUN SERPL-MCNC: 12 MG/DL (ref 7–18)
BUN/CREAT SERPL: 11 (ref 12–20)
CALCIUM SERPL-MCNC: 9.2 MG/DL (ref 8.5–10.1)
CHLORIDE SERPL-SCNC: 106 MMOL/L (ref 100–108)
CO2 SERPL-SCNC: 26 MMOL/L (ref 21–32)
COLOR UR: YELLOW
CREAT SERPL-MCNC: 1.12 MG/DL (ref 0.6–1.3)
DIFFERENTIAL METHOD BLD: ABNORMAL
EOSINOPHIL # BLD: 0.4 K/UL (ref 0–0.4)
EOSINOPHIL NFR BLD: 4 % (ref 0–5)
ERYTHROCYTE [DISTWIDTH] IN BLOOD BY AUTOMATED COUNT: 14.9 % (ref 11.6–14.5)
GLOBULIN SER CALC-MCNC: 3.8 G/DL (ref 2–4)
GLUCOSE SERPL-MCNC: 99 MG/DL (ref 74–99)
GLUCOSE UR STRIP.AUTO-MCNC: NEGATIVE MG/DL
HCT VFR BLD AUTO: 42.6 % (ref 36–48)
HGB BLD-MCNC: 13 G/DL (ref 13–16)
HGB UR QL STRIP: NEGATIVE
KETONES UR QL STRIP.AUTO: NEGATIVE MG/DL
LEUKOCYTE ESTERASE UR QL STRIP.AUTO: NEGATIVE
LYMPHOCYTES # BLD: 2 K/UL (ref 0.9–3.6)
LYMPHOCYTES NFR BLD: 24 % (ref 21–52)
MCH RBC QN AUTO: 25.1 PG (ref 24–34)
MCHC RBC AUTO-ENTMCNC: 30.5 G/DL (ref 31–37)
MCV RBC AUTO: 82.4 FL (ref 74–97)
MONOCYTES # BLD: 0.7 K/UL (ref 0.05–1.2)
MONOCYTES NFR BLD: 9 % (ref 3–10)
NEUTS SEG # BLD: 5.3 K/UL (ref 1.8–8)
NEUTS SEG NFR BLD: 63 % (ref 40–73)
NITRITE UR QL STRIP.AUTO: NEGATIVE
PH UR STRIP: 5 [PH] (ref 5–8)
PLATELET # BLD AUTO: 357 K/UL (ref 135–420)
PMV BLD AUTO: 10.1 FL (ref 9.2–11.8)
POTASSIUM SERPL-SCNC: 3.7 MMOL/L (ref 3.5–5.5)
PROT SERPL-MCNC: 8 G/DL (ref 6.4–8.2)
PROT UR STRIP-MCNC: NEGATIVE MG/DL
RBC # BLD AUTO: 5.17 M/UL (ref 4.7–5.5)
SODIUM SERPL-SCNC: 138 MMOL/L (ref 136–145)
SP GR UR REFRACTOMETRY: 1.02 (ref 1–1.03)
UROBILINOGEN UR QL STRIP.AUTO: 0.2 EU/DL (ref 0.2–1)
WBC # BLD AUTO: 8.4 K/UL (ref 4.6–13.2)

## 2019-04-24 PROCEDURE — 80053 COMPREHEN METABOLIC PANEL: CPT

## 2019-04-24 PROCEDURE — 81003 URINALYSIS AUTO W/O SCOPE: CPT

## 2019-04-24 PROCEDURE — 36415 COLL VENOUS BLD VENIPUNCTURE: CPT

## 2019-04-24 PROCEDURE — 85025 COMPLETE CBC W/AUTO DIFF WBC: CPT

## 2019-04-24 RX ORDER — CYCLOBENZAPRINE HCL 10 MG
10 TABLET ORAL
COMMUNITY
Start: 2017-01-02 | End: 2019-04-24

## 2019-04-24 NOTE — PROGRESS NOTES
ROOM # 1    Johnathan Smith presents today for   Chief Complaint   Patient presents with    Abdominal Pain     3 days ago       Johnathan Smith preferred language for health care discussion is english/other. Is someone accompanying this pt? Yes wife    Is the patient using any DME equipment during [address]? No    Depression Screening:  3 most recent PHQ Screens 4/10/2017 3/7/2017   Little interest or pleasure in doing things Not at all Not at all   Feeling down, depressed, irritable, or hopeless Not at all Not at all   Total Score PHQ 2 0 0       Learning Assessment:  Learning Assessment 4/10/2017 3/7/2017   PRIMARY LEARNER Patient Patient   HIGHEST LEVEL OF EDUCATION - PRIMARY LEARNER  4 YEARS OF COLLEGE 4 YEARS OF COLLEGE   BARRIERS PRIMARY LEARNER NONE NONE   CO-LEARNER CAREGIVER No No   PRIMARY LANGUAGE ENGLISH ENGLISH   LEARNER PREFERENCE PRIMARY READING READING     VIDEOS -   ANSWERED BY patient patient   RELATIONSHIP SELF SELF       Abuse Screening:  No flowsheet data found. Fall Risk  No flowsheet data found. Health Maintenance reviewed and discussed per provider. Yes    Johnathan Smith is due for There are no preventive care reminders to display for this patient. Please order/place referral if appropriate. Advance Directive:  1. Do you have an advance directive in place? Patient Reply: No    2. If not, would you like material regarding how to put one in place? Patient Reply: No    Coordination of Care:  1. Have you been to the ER, urgent care clinic since your last visit? Hospitalized since your last visit? No    2. Have you seen or consulted any other health care providers outside of the [address] [place] [address] since your last visit? Include any pap smears or colon screening.  No

## 2019-04-24 NOTE — PROGRESS NOTES
Chief Complaint   Patient presents with    Abdominal Pain     3 days ago       HPI:     Sheela Ross is a 28 y.o.  male with history of internal hemorrhoids  here for the above complaint. Left lower and flank pain and radiates to epigastric area. He has been feeling muscle spasms. He denies any chest pain, shortness of breath, abdominal pain, headaches or dizziness, constipation, blood or black tarry stools, dysuria, hematuria. Off and on pain and pain with some movement and pain scale: 3/10. And when touches it 5-6/10. He said he was cutting wood at an angle and then pain occurred. The way he was cutting the wood seems to correlate to where his pain is located at. He said he took ibuprofen for 1 day which helped. Past Medical History:   Diagnosis Date    H/O colonoscopy with polypectomy 05/04/2017    internal hemorrhoids and rectal polyps per Dr. Shantel Quiñonez     Past Surgical History:   Procedure Laterality Date    HX WISDOM TEETH EXTRACTION       No current outpatient medications on file. No current facility-administered medications for this visit. Health Maintenance   Topic Date Due    Influenza Age 5 to Adult  08/01/2019    DTaP/Tdap/Td series (2 - Td) 03/07/2027    Pneumococcal 0-64 years  Aged [employer] History   Administered Date(s) Administered    Hep A Vaccine (Adult) 03/07/2017    Influenza Vaccine (Quad) PF 03/07/2017    Tdap 03/07/2017     No LMP for male patient. Allergies and Intolerances:   No Known Allergies    Family History:   Family History   Problem Relation Age of Onset    No Known Problems Mother     Cancer Father        Social History:   He  reports that he has quit smoking. He has never used smokeless tobacco.  He  reports that he drinks about 1.8 oz of alcohol per week.           ·     Objective:   Physical exam:   Visit Vitals  /81 (BP 1 Location: Right arm, BP Patient Position: Sitting)   Pulse 70   Temp 99 °F (37.2 °C) (Oral)   Resp 16   Ht 5' 8\" (1.727 m)   Wt 262 lb (118.8 kg)   SpO2 99%   BMI 39.84 kg/m²        Generally: Pleasant male in no acute distress  Cardiac Exam: regular, rate, and rhythm. Normal S1 and S2. No murmurs, gallops, or rubs  Pulmonary exam: Clear to auscultation bilaterally  Abdominal exam: Positive bowel sounds in all four quadrants, soft, nondistended, nontender, pain is at the left flank/lower abdominal area. Back exam: negative CVA tenderness bilaterally  Extremities: 2+ dorsalis pedis pulses bilaterally. No pedal edema    bilaterally    LABS/Radiological Tests:  none      ASSESSMENT/PLAN:    1. Left flank pain  -     CBC WITH AUTOMATED DIFF; Future  -     METABOLIC PANEL, COMPREHENSIVE; Future  -     URINALYSIS W/ RFLX MICROSCOPIC; Future    2. Abdominal pain, left lower quadrant  -     CBC WITH AUTOMATED DIFF; Future  -     METABOLIC PANEL, COMPREHENSIVE; Future  -     URINALYSIS W/ RFLX MICROSCOPIC; Future    Think this is musculoskeletal from chopping the wood. He can use heating pad, icy/hot, tiger balm for pain. Use heating pad, icy/hot, tiger balm for pain. He can take ibuprofen as needed for pain for about a week Take iburpofen With food. If you notice any blood/black tarry stools, diarrhea, abdominal pain, nausea, vomiting then stop medication immediately. Give us a call ASAP. 3. Patient verbalized understanding and agreement with the plan. 4. Patient was given an after-visit summary. 5.   Follow-up and Dispositions    Return if symptoms worsen or fail to improve or sooner if worsening symptoms.   ·                     Maricarmen Mancilla MD

## 2019-04-24 NOTE — PATIENT INSTRUCTIONS
1) Use heating pad, icy/hot, tiger balm for pain. 2) You can take ibuprofen as needed for pain for about a week Take iburpofen With food. If you notice any blood/black tarry stools, diarrhea, abdominal pain, nausea, vomiting then stop medication immediately. Give us a call ASAP. 3) Follow-up as needed or sooner if worsening symptoms.

## 2019-04-25 ENCOUNTER — TELEPHONE (OUTPATIENT)
Dept: INTERNAL MEDICINE CLINIC | Age: 34
End: 2019-04-25

## 2019-04-25 NOTE — TELEPHONE ENCOUNTER
Patient contacted at home number. 2 patient identifiers confirmed. Patient states he has reviewed labs online via [address] No other questions or concerns at this time.

## 2019-04-25 NOTE — PROGRESS NOTES
Attempted to contact pt at  number, no answer. Lvm for pt to return call to office at [phone] . Will continue to try to contact pt.

## 2019-04-29 NOTE — PROGRESS NOTES
2nd Attempted to contact pt at  number, no answer. Lvm for pt to return call to office at [phone] . Will continue to try to contact pt.

## 2019-04-30 NOTE — PROGRESS NOTES
3rd attempt  to contact pt at  number, no answer. I have been unable to reach this patient by phone. A letter is being sent to the last known home address.

## 2019-10-02 ENCOUNTER — OFFICE VISIT (OUTPATIENT)
Dept: INTERNAL MEDICINE CLINIC | Age: 34
End: 2019-10-02

## 2019-10-02 VITALS
TEMPERATURE: 98.6 F | HEIGHT: 68 IN | OXYGEN SATURATION: 100 % | BODY MASS INDEX: 38.19 KG/M2 | RESPIRATION RATE: 17 BRPM | SYSTOLIC BLOOD PRESSURE: 126 MMHG | WEIGHT: 252 LBS | DIASTOLIC BLOOD PRESSURE: 81 MMHG | HEART RATE: 71 BPM

## 2019-10-02 DIAGNOSIS — R09.81 SINUS CONGESTION: Primary | ICD-10-CM

## 2019-10-02 RX ORDER — FLUTICASONE PROPIONATE 50 MCG
2 SPRAY, SUSPENSION (ML) NASAL DAILY
Qty: 1 BOTTLE | Refills: 2 | Status: SHIPPED | OUTPATIENT
Start: 2019-10-02

## 2019-10-02 NOTE — PROGRESS NOTES
ROOM # 2  Identified pt with two pt identifiers(name and ). Reviewed record in preparation for visit and have obtained necessary documentation. Chief Complaint   Patient presents with    Headache     x 1 days       Lorena Moss preferred language for health care discussion is english/other. Is the patient using any DME equipment during OV? NO    Lorena Moss is due for:  Health Maintenance Due   Topic    Influenza Age 5 to Adult      Health Maintenance reviewed and discussed per provider  Please order/place referral if appropriate. Advance Directive:  1. Do you have an advance directive in place? Patient Reply: NO    2. If not, would you like material regarding how to put one in place? NO    Coordination of Care:  1. Have you been to the ER, urgent care clinic since your last visit? Hospitalized since your last visit? NO    2. Have you seen or consulted any other health care providers outside of the [address] [place] [address] since your last visit? Include any pap smears or colon screening. NO    Patient is accompanied by self I have received verbal consent from Lorena Moss to discuss any/all medical information while they are present in the room.     Learning Assessment:  Learning Assessment 4/10/2017 3/7/2017   PRIMARY LEARNER Patient Patient   HIGHEST LEVEL OF EDUCATION - PRIMARY LEARNER  4 YEARS OF COLLEGE 4 YEARS OF COLLEGE   BARRIERS PRIMARY LEARNER NONE NONE   CO-LEARNER CAREGIVER No No   PRIMARY LANGUAGE ENGLISH ENGLISH   LEARNER PREFERENCE PRIMARY READING READING     VIDEOS -   ANSWERED BY patient patient   RELATIONSHIP SELF SELF     Depression Screening:  3 most recent PHQ Screens 4/10/2017 3/7/2017   Little interest or pleasure in doing things Not at all Not at all   Feeling down, depressed, irritable, or hopeless Not at all Not at all   Total Score PHQ 2 0 0     Abuse Screening:  n/i  Fall Risk  n/i

## 2019-10-02 NOTE — PROGRESS NOTES
Chief Complaint   Patient presents with    Headache     x 1 days        HPI:     Kadi Hunt is a 27 y.o.  male with history of  Colonoscopy  here for the above complaint. Frontal head pressure for 2 days and around eyes and maxillary sinus pressure bilaterally. He has been traveling. He has lightheadness and he is stressed. Little bit of dizziness. No blurred vision. Faint dark spots. Felt sleepy, but no syncope. No nasal congestion, itchy watery eyes or runny nose. Light affects, but not sound and no problems with nausea or vomiting. Doesn't start in the back of neck. No problems with one side of the head. No clusters  Headaches. Pain scale: 1-2/10. BC powder didn't help. He said it feels better when he takes a hot steamy shower and laying flat. No teeth pain or post nasal drainage. He does not wake up in the morning with headaches. Past Medical History:   Diagnosis Date    H/O colonoscopy with polypectomy 05/04/2017    internal hemorrhoids and rectal polyps per Dr. Ashtyn Farfan     Past Surgical History:   Procedure Laterality Date    HX WISDOM TEETH EXTRACTION       Current Outpatient Medications   Medication Sig    fluticasone propionate (FLONASE) 50 mcg/actuation nasal spray 2 Sprays by Both Nostrils route daily. No current facility-administered medications for this visit. Health Maintenance   Topic Date Due    Influenza Age 5 to Adult  08/01/2019    DTaP/Tdap/Td series (2 - Td) 03/07/2027    Pneumococcal 0-64 years  Aged [employer] History   Administered Date(s) Administered    Hep A Vaccine (Adult) 03/07/2017    Influenza Vaccine (Quad) PF 03/07/2017    Tdap 03/07/2017     No LMP for male patient. Allergies and Intolerances:   No Known Allergies    Family History:   Family History   Problem Relation Age of Onset    No Known Problems Mother     Cancer Father        Social History:   He  reports that he has quit smoking.  He has never used smokeless tobacco.  He  reports that he drinks about 3.0 standard drinks of alcohol per week. ·     Objective:   Physical exam:   Visit Vitals  /81 (BP 1 Location: Left arm, BP Patient Position: Sitting)   Pulse 71   Temp 98.6 °F (37 °C) (Oral)   Resp 17   Ht 5' 8\" (1.727 m)   Wt 252 lb (114.3 kg)   SpO2 100%   BMI 38.32 kg/m²        Generally: Pleasant male in no acute distress  HEENT Exam: Head: atraumatic               Eyes: PERRLA    Ears: bilaterally normal TM, no erythema or exudate, normal light reflex    Nares: right nostril: moist mucosa, no erythema, left nostril: moist mucosa, swollen turbinate and erythema    Mouth: Clear, no erythema or exudate    Neck: supple, no LAD  Cardiac Exam: regular, rate, and rhythm. Normal S1 and S2. No murmurs, gallops, or rubs  Pulmonary exam: Clear to auscultation bilaterally    LABS/Radiological Tests:  Lab Results   Component Value Date/Time    WBC 8.4 2019 11:41 AM    HGB 13.0 2019 11:41 AM    HCT 42.6 2019 11:41 AM    PLATELET 260  11:41 AM     Lab Results   Component Value Date/Time    Sodium 138 2019 11:41 AM    Potassium 3.7 2019 11:41 AM    Chloride 106 2019 11:41 AM    CO2 26 2019 11:41 AM    Glucose 99 2019 11:41 AM    BUN 12 2019 11:41 AM    Creatinine 1.12 2019 11:41 AM     No results found for: CHOL, CHOLX, CHLST, CHOLV, HDL, HDLP, LDL, LDLC, DLDLP, TGLX, TRIGL, TRIGP  No results found for: GPT  Previous labs      ASSESSMENT/PLAN:    1. Sinus congestion  -     fluticasone propionate (FLONASE) 50 mcg/actuation nasal spray; 2 Sprays by Both Nostrils route daily. - use hot steamy showers and simply saline. 2.   Requested Prescriptions     Signed Prescriptions Disp Refills    fluticasone propionate (FLONASE) 50 mcg/actuation nasal spray 1 Bottle 2     Si Sprays by Both Nostrils route daily. 3. Patient verbalized understanding and agreement with the plan.     4. Patient was given an after-visit summary. 5.   Follow-up and Dispositions    · Return if symptoms worsen or fail to improve or sooner if worsening symptoms.                       Jenniffer Lopez MD

## 2019-10-02 NOTE — PATIENT INSTRUCTIONS
1) Use hot steamy showers and simply saline. 2) Follow-up as needed or sooner if worsening symptoms. Saline Nasal Washes: Care Instructions  Your Care Instructions  Saline nasal washes help keep the nasal passages open by washing out thick or dried mucus. This simple remedy can help relieve symptoms of allergies, sinusitis, and colds. It also can make the nose feel more comfortable by keeping the mucous membranes moist. You may notice a little burning sensation in your nose the first few times you use the solution, but this usually gets better in a few days. Follow-up care is a key part of your treatment and safety. Be sure to make and go to all appointments, and call your doctor if you are having problems. It's also a good idea to know your test results and keep a list of the medicines you take. How can you care for yourself at home? · You can buy premixed saline solution in a squeeze bottle or other sinus rinse products at a drugstore. Read and follow the instructions on the label. · You also can make your own saline solution by adding 1 teaspoon of salt and 1 teaspoon of baking soda to 2 cups of distilled water. · If you use a homemade solution, pour a small amount into a clean bowl. Using a rubber bulb syringe, squeeze the syringe and place the tip in the salt water. Pull a small amount of the salt water into the syringe by relaxing your hand. · Sit down with your head tilted slightly back. Do not lie down. Put the tip of the bulb syringe or the squeeze bottle a little way into one of your nostrils. Gently drip or squirt a few drops into the nostril. Repeat with the other nostril. Some sneezing and gagging are normal at first.  · Gently blow your nose. · Wipe the syringe or bottle tip clean after each use. · Repeat this 2 or 3 times a day. · Use nasal washes gently if you have nosebleeds often. When should you call for help?   Watch closely for changes in your health, and be sure to contact your doctor if:    · You often get nosebleeds.     · You have problems doing the nasal washes. Where can you learn more? Go to http://funmi-arnold.info/. Enter 888 [id number] [id number] 47 in the search box to learn more about \"Saline Nasal Washes: Care Instructions. \"  Current as of: October 21, 2018  Content Version: 12.2  © 5729-0723 [employer]. Care instructions adapted under license by "[employer] (which disclaims liability or warranty for this information). If you have questions about a medical condition or this instruction, always ask your healthcare professional. [place] [address] any warranty or liability for your use of this information.